# Patient Record
Sex: FEMALE | Race: WHITE
[De-identification: names, ages, dates, MRNs, and addresses within clinical notes are randomized per-mention and may not be internally consistent; named-entity substitution may affect disease eponyms.]

---

## 2019-02-07 ENCOUNTER — HOSPITAL ENCOUNTER (OUTPATIENT)
Dept: HOSPITAL 46 - OPS | Age: 54
Discharge: HOME | End: 2019-02-07
Attending: SURGERY
Payer: COMMERCIAL

## 2019-02-07 VITALS — BODY MASS INDEX: 28.25 KG/M2 | HEIGHT: 67 IN | WEIGHT: 180.01 LBS

## 2019-02-07 DIAGNOSIS — K57.30: ICD-10-CM

## 2019-02-07 DIAGNOSIS — Z12.11: Primary | ICD-10-CM

## 2019-02-07 PROCEDURE — G0500 MOD SEDAT ENDO SERVICE >5YRS: HCPCS

## 2019-02-07 PROCEDURE — 0DJD8ZZ INSPECTION OF LOWER INTESTINAL TRACT, VIA NATURAL OR ARTIFICIAL OPENING ENDOSCOPIC: ICD-10-PCS | Performed by: SURGERY

## 2019-02-07 NOTE — NUR
02/07/19 Charlene4 Emily Stevens
1029-PATIENT ARRIVED TO PACU ON 3L NC. PATIENT REACTIVE TO VOICE EYES
CLOSED. DENIES PAIN.
LAYING LEFT LATERAL ABDOMEN SOFT.

## 2019-02-08 NOTE — OR
[facility]
                                    [address], [place]  [address]
_________________________________________________________________________________________
                                                                 Signed   
 
 
DATE OF OPERATION:
02/07/2019
 
SURGEON:
Hemant Dao MD
 
PREOPERATIVE DIAGNOSIS:
Screening.
 
POSTOPERATIVE DIAGNOSIS:
Minimal sigmoid diverticulosis.
 
PROCEDURE:
Colonoscopy without biopsy.
 
ESTIMATED BLOOD LOSS:
None.
 
INDICATIONS:
Annabella is a 53-year-old female who was asked to see me for her initial screening
colonoscopy.  She has no lower GI complaints.  There is no family history of colon
cancer or polyps.  In the office, I gave her a pamphlet on colonoscopy.  She understands
the nature of the test along with the risks including, but not limited to gas, bloating,
crampy abdominal pain, bleeding, perforation, requiring surgery, and missed diagnosis.
She also understands the need for IV conscious sedation.  She had expressed
understanding and wished to proceed. 
 
PROCEDURE NOTE:
Annabella was taken into our endoscopy suite and placed in the left lateral decubitus
position.  She was given IV sedation with 7 mg of Versed and 150 mcg of fentanyl.  A
digital rectal exam was performed and this was unremarkable.  The adult colonoscope was
introduced and advanced under direct visualization of camera.  Annabella is slight of
build and her sigmoid colon and left colon were somewhat long and a little bit narrow
and angulated.  It took a few minutes to get through here through this area along with
some additional sedation and abdominal compression.  The scope then passed quite readily
into the cecum itself.  Her prep was quite good.  We could easily see the appendiceal
orifice and the ileocecal valve.  The scope was then slowly withdrawn.  We took pictures
throughout for photodocumentation.  She had just a few diverticula in her sigmoid colon.
 Otherwise no findings.  The rectum was unremarkable.  Upon retroflexion of the scope,
she has no evidence of any pathology associated with the anal canal.  After this, the
gas was suctioned out and the colonoscope removed.  Annabella tolerated the procedure
quite well. 
 
    Electronically Signed By: HEMANT DAO MD  02/08/19 0918
_________________________________________________________________________________________
PATIENT NAME:     ANNABELLA MALLOY                
MEDICAL RECORD #: [id number]            OPERATIVE REPORT              
          ACCT #: [id number]  
DATE OF BIRTH:   [birth year]            REPORT #: [id number]      
PHYSICIAN:        HMEANT DAO MD             
PCP:              NO PRIMARY CARE PHYSICIAN     
REPORT IS CONFIDENTIAL AND NOT TO BE RELEASED WITHOUT AUTHORIZATION
 
 
                                  [address]  [address]
_________________________________________________________________________________________
                                                                 Signed   
 
 
 
RECOMMENDATIONS:
Annabella can follow up in 10 years for repeat colonoscopy.
 
 
 
            ________________________________________
            Hemant Dao MD 
 
 
ALB/MODL
Job #:  [id number]
DD:  02/07/2019 10:34:34
DT:  02/07/2019 11:48:34
 
cc:            MD Hemant Sanabria MD
 
 
Copies:  EITAN SIMONS MD, ANDREW L MD
~
 
 
 
 
 
 
 
 
 
 
 
 
 
 
 
 
 
 
 
 
    Electronically Signed By: HEMANT DAO MD  02/08/19 0918
_________________________________________________________________________________________
PATIENT NAME:     ANNABELLA MALLOY                
MEDICAL RECORD #: [id number]            OPERATIVE REPORT              
          ACCT #: [id number]  
DATE OF BIRTH:   [birth year]            REPORT #: [id number]      
PHYSICIAN:        HEMANT DAO MD             
PCP:              NO PRIMARY CARE PHYSICIAN     
REPORT IS CONFIDENTIAL AND NOT TO BE RELEASED WITHOUT AUTHORIZATION